# Patient Record
Sex: FEMALE | Race: OTHER | Employment: OTHER | URBAN - NONMETROPOLITAN AREA
[De-identification: names, ages, dates, MRNs, and addresses within clinical notes are randomized per-mention and may not be internally consistent; named-entity substitution may affect disease eponyms.]

---

## 2021-01-27 ENCOUNTER — OFFICE VISIT (OUTPATIENT)
Dept: INTERNAL MEDICINE | Age: 86
End: 2021-01-27
Payer: MEDICARE

## 2021-01-27 ENCOUNTER — TELEPHONE (OUTPATIENT)
Dept: INTERNAL MEDICINE | Age: 86
End: 2021-01-27

## 2021-01-27 VITALS
OXYGEN SATURATION: 96 % | DIASTOLIC BLOOD PRESSURE: 70 MMHG | HEART RATE: 84 BPM | HEIGHT: 69 IN | SYSTOLIC BLOOD PRESSURE: 118 MMHG

## 2021-01-27 DIAGNOSIS — K21.9 GASTROESOPHAGEAL REFLUX DISEASE WITHOUT ESOPHAGITIS: ICD-10-CM

## 2021-01-27 DIAGNOSIS — I48.91 ATRIAL FIBRILLATION, UNSPECIFIED TYPE (HCC): ICD-10-CM

## 2021-01-27 DIAGNOSIS — E55.9 HYPOVITAMINOSIS D: ICD-10-CM

## 2021-01-27 DIAGNOSIS — R31.9 HEMATURIA, UNSPECIFIED TYPE: Primary | ICD-10-CM

## 2021-01-27 DIAGNOSIS — E87.6 HYPOKALEMIA: ICD-10-CM

## 2021-01-27 DIAGNOSIS — M35.3 POLYMYALGIA RHEUMATICA (HCC): ICD-10-CM

## 2021-01-27 DIAGNOSIS — F41.9 ANXIETY: ICD-10-CM

## 2021-01-27 DIAGNOSIS — Z86.73 HISTORY OF CVA (CEREBROVASCULAR ACCIDENT): ICD-10-CM

## 2021-01-27 DIAGNOSIS — L89.156 PRESSURE INJURY OF DEEP TISSUE OF SACRAL REGION: ICD-10-CM

## 2021-01-27 DIAGNOSIS — R31.9 HEMATURIA, UNSPECIFIED TYPE: ICD-10-CM

## 2021-01-27 DIAGNOSIS — E43 SEVERE PROTEIN-CALORIE MALNUTRITION (HCC): ICD-10-CM

## 2021-01-27 PROBLEM — E46 PROTEIN-CALORIE MALNUTRITION (HCC): Status: ACTIVE | Noted: 2021-01-27

## 2021-01-27 LAB
ALBUMIN SERPL-MCNC: 2.6 G/DL (ref 3.5–5.2)
ALP BLD-CCNC: 106 U/L (ref 35–104)
ALT SERPL-CCNC: 22 U/L (ref 5–33)
ANION GAP SERPL CALCULATED.3IONS-SCNC: 12 MMOL/L (ref 7–19)
AST SERPL-CCNC: 20 U/L (ref 5–32)
BASOPHILS ABSOLUTE: 0.1 K/UL (ref 0–0.2)
BASOPHILS RELATIVE PERCENT: 0.7 % (ref 0–1)
BILIRUB SERPL-MCNC: 0.8 MG/DL (ref 0.2–1.2)
BUN BLDV-MCNC: 12 MG/DL (ref 8–23)
CALCIUM SERPL-MCNC: 8.5 MG/DL (ref 8.8–10.2)
CHLORIDE BLD-SCNC: 100 MMOL/L (ref 98–111)
CHOLESTEROL, TOTAL: 198 MG/DL (ref 160–199)
CO2: 28 MMOL/L (ref 22–29)
CREAT SERPL-MCNC: 0.5 MG/DL (ref 0.5–0.9)
EOSINOPHILS ABSOLUTE: 0.1 K/UL (ref 0–0.6)
EOSINOPHILS RELATIVE PERCENT: 0.9 % (ref 0–5)
GFR AFRICAN AMERICAN: >59
GFR NON-AFRICAN AMERICAN: >60
GLUCOSE BLD-MCNC: 145 MG/DL (ref 74–109)
HCT VFR BLD CALC: 39.9 % (ref 37–47)
HDLC SERPL-MCNC: 37 MG/DL (ref 65–121)
HEMOGLOBIN: 12.6 G/DL (ref 12–16)
IMMATURE GRANULOCYTES #: 0.1 K/UL
LDL CHOLESTEROL CALCULATED: 138 MG/DL
LYMPHOCYTES ABSOLUTE: 1.2 K/UL (ref 1.1–4.5)
LYMPHOCYTES RELATIVE PERCENT: 9.7 % (ref 20–40)
MCH RBC QN AUTO: 29.9 PG (ref 27–31)
MCHC RBC AUTO-ENTMCNC: 31.6 G/DL (ref 33–37)
MCV RBC AUTO: 94.8 FL (ref 81–99)
MONOCYTES ABSOLUTE: 0.7 K/UL (ref 0–0.9)
MONOCYTES RELATIVE PERCENT: 5.4 % (ref 0–10)
NEUTROPHILS ABSOLUTE: 10.5 K/UL (ref 1.5–7.5)
NEUTROPHILS RELATIVE PERCENT: 82.7 % (ref 50–65)
PDW BLD-RTO: 15.1 % (ref 11.5–14.5)
PLATELET # BLD: 377 K/UL (ref 130–400)
PMV BLD AUTO: 9.8 FL (ref 9.4–12.3)
POTASSIUM SERPL-SCNC: 2.9 MMOL/L (ref 3.5–5)
RBC # BLD: 4.21 M/UL (ref 4.2–5.4)
SEDIMENTATION RATE, ERYTHROCYTE: 80 MM/HR (ref 0–25)
SODIUM BLD-SCNC: 140 MMOL/L (ref 136–145)
TOTAL PROTEIN: 7.5 G/DL (ref 6.6–8.7)
TRIGL SERPL-MCNC: 115 MG/DL (ref 0–149)
TSH SERPL DL<=0.05 MIU/L-ACNC: 2.32 UIU/ML (ref 0.27–4.2)
VITAMIN D 25-HYDROXY: 11.6 NG/ML
WBC # BLD: 12.7 K/UL (ref 4.8–10.8)

## 2021-01-27 PROCEDURE — 99203 OFFICE O/P NEW LOW 30 MIN: CPT | Performed by: INTERNAL MEDICINE

## 2021-01-27 RX ORDER — OMEPRAZOLE 20 MG/1
CAPSULE, DELAYED RELEASE ORAL
COMMUNITY
End: 2021-05-24 | Stop reason: SDUPTHER

## 2021-01-27 RX ORDER — PREDNISONE 1 MG/1
TABLET ORAL
COMMUNITY
End: 2021-01-27 | Stop reason: SDUPTHER

## 2021-01-27 RX ORDER — METOPROLOL SUCCINATE 25 MG/1
TABLET, EXTENDED RELEASE ORAL
COMMUNITY
End: 2021-07-26

## 2021-01-27 RX ORDER — POTASSIUM CHLORIDE 750 MG/1
10 TABLET, EXTENDED RELEASE ORAL 2 TIMES DAILY
Qty: 10 TABLET | Refills: 0 | Status: SHIPPED | OUTPATIENT
Start: 2021-01-27 | End: 2021-09-21 | Stop reason: SDUPTHER

## 2021-01-27 RX ORDER — ERGOCALCIFEROL 1.25 MG/1
50000 CAPSULE ORAL WEEKLY
Qty: 12 CAPSULE | Refills: 1 | Status: SHIPPED | OUTPATIENT
Start: 2021-01-27 | End: 2021-09-21 | Stop reason: SDUPTHER

## 2021-01-27 RX ORDER — ALPRAZOLAM 0.5 MG/1
TABLET ORAL
COMMUNITY

## 2021-01-27 RX ORDER — PREDNISONE 1 MG/1
7.5 TABLET ORAL DAILY
Qty: 135 TABLET | Refills: 1
Start: 2021-01-27 | End: 2021-03-09 | Stop reason: SDUPTHER

## 2021-01-27 RX ORDER — DIGOXIN 125 MCG
TABLET ORAL
COMMUNITY

## 2021-01-27 SDOH — ECONOMIC STABILITY: FOOD INSECURITY: WITHIN THE PAST 12 MONTHS, YOU WORRIED THAT YOUR FOOD WOULD RUN OUT BEFORE YOU GOT MONEY TO BUY MORE.: NEVER TRUE

## 2021-01-27 SDOH — ECONOMIC STABILITY: FOOD INSECURITY: WITHIN THE PAST 12 MONTHS, THE FOOD YOU BOUGHT JUST DIDN'T LAST AND YOU DIDN'T HAVE MONEY TO GET MORE.: NEVER TRUE

## 2021-01-27 SDOH — ECONOMIC STABILITY: TRANSPORTATION INSECURITY
IN THE PAST 12 MONTHS, HAS THE LACK OF TRANSPORTATION KEPT YOU FROM MEDICAL APPOINTMENTS OR FROM GETTING MEDICATIONS?: NOT ASKED

## 2021-01-27 SDOH — ECONOMIC STABILITY: TRANSPORTATION INSECURITY
IN THE PAST 12 MONTHS, HAS LACK OF TRANSPORTATION KEPT YOU FROM MEETINGS, WORK, OR FROM GETTING THINGS NEEDED FOR DAILY LIVING?: NOT ASKED

## 2021-01-27 ASSESSMENT — ENCOUNTER SYMPTOMS
BLOOD IN STOOL: 0
DIARRHEA: 0
RHINORRHEA: 0
WHEEZING: 0
TROUBLE SWALLOWING: 0
COUGH: 0
SHORTNESS OF BREATH: 0
SINUS PAIN: 0
VOMITING: 0
CHEST TIGHTNESS: 0
ABDOMINAL PAIN: 0
CONSTIPATION: 0
BACK PAIN: 0

## 2021-01-27 ASSESSMENT — PATIENT HEALTH QUESTIONNAIRE - PHQ9
SUM OF ALL RESPONSES TO PHQ QUESTIONS 1-9: 0
SUM OF ALL RESPONSES TO PHQ QUESTIONS 1-9: 0
2. FEELING DOWN, DEPRESSED OR HOPELESS: 0
SUM OF ALL RESPONSES TO PHQ9 QUESTIONS 1 & 2: 0
1. LITTLE INTEREST OR PLEASURE IN DOING THINGS: 0

## 2021-01-27 NOTE — TELEPHONE ENCOUNTER
Called and spoke to Sasha Carrillo 56 she voice understood the results she is fine with the increase in prednisone.

## 2021-01-27 NOTE — TELEPHONE ENCOUNTER
----- Message from Erasmo Meadows MD sent at 1/27/2021  4:01 PM CST -----  Extremely low vitamin D levels which will need replacement  Patient sedimentation rate is elevated and would like him to increase prednisone if granddaughter approves  TSH normal  Cholesterol level normal  Potassium very low patient needs potassium for the next couple of days and albumin is 2.5 patient is extremely malnourished

## 2021-01-29 ENCOUNTER — TELEPHONE (OUTPATIENT)
Dept: INTERNAL MEDICINE CLINIC | Age: 86
End: 2021-01-29

## 2021-01-29 NOTE — TELEPHONE ENCOUNTER
[address] [address] OT requesting prescription for narrow width, nafisa height light weight wheelchair and gel cusion be faxed  to [facility]      Also fyi :  SW will be going next week due to having multiple [place] staff  visits today

## 2021-02-01 ENCOUNTER — TELEPHONE (OUTPATIENT)
Dept: INTERNAL MEDICINE CLINIC | Age: 86
End: 2021-02-01

## 2021-02-01 NOTE — TELEPHONE ENCOUNTER
Jasen Garcia did follow up with pt grdt, Dr Alvarez Martinez, this morning. Per DILIA \" Dilia and Grdt completed a phone visit with discussion of advance directives for pt as well as applying for community resources. The grdt asked for a link to apply for food stamps and sw provided that and she asked about getting appointed as pts health care proxy(her words). Sw educated her on how sw could help with this but grdt stated she does not think pt will understand. The grdt stated they did have some advance directives in place and she is going to try and locate them, so they will not have to be done again. If she cannot find them, she plans to talk to pt in her native language and see if she can get her to understand. Grdt stated she will call sw back at a later time if sw help is needed. Sw did go ahead and schedule another phone visit if needed.  Sw to follow as instructed by the grdt, Dr Alvarez Martinez \"    cristopheri

## 2021-02-08 ENCOUNTER — TELEPHONE (OUTPATIENT)
Dept: INTERNAL MEDICINE CLINIC | Age: 86
End: 2021-02-08

## 2021-02-08 NOTE — TELEPHONE ENCOUNTER
[address] [address] OT eval done and per OT :   Pt/family training with DME for bathing routine; pt continues to require assistance  OT  instructed pd cg on transfer training to TTB  (pt adamantly refused transfer or shower) Education Completion/Further need:  instructed pd cg on use of TTB  good return demo from pd cg on technique for use of TTB  pt verbally and physically refuses to attempt transfer or shower training with MU[facility]IZ this date  at previous session reluctantly did so but required verbal/visual and tactile cues and assist to scoot on bench  total assist for bathing/dressing and would not attempt to actively participate in either  no further OT needs identified at this time    Recommending :  PCA/ Bath Aid  2x/week for duration of skilled nursing care    Please advise if approved

## 2021-02-12 ENCOUNTER — TELEPHONE (OUTPATIENT)
Dept: INTERNAL MEDICINE | Age: 86
End: 2021-02-12

## 2021-02-12 NOTE — TELEPHONE ENCOUNTER
[facility] called stated that the patient met 4 of her goals for PT and they have discharged her today.

## 2021-02-17 ENCOUNTER — TELEPHONE (OUTPATIENT)
Dept: INTERNAL MEDICINE CLINIC | Age: 86
End: 2021-02-17

## 2021-02-17 NOTE — TELEPHONE ENCOUNTER
[address] [facility] 9 requesting to add on speech therapy eval due to pt chokes on liquids     Please advise

## 2021-02-19 ENCOUNTER — TELEPHONE (OUTPATIENT)
Dept: INTERNAL MEDICINE CLINIC | Age: 86
End: 2021-02-19

## 2021-02-19 NOTE — TELEPHONE ENCOUNTER
[address] [address] Speech Therapy attempted eval today and ST did not feel they really were able to assess her well  - so she has planned to eval again next week with Dr Kostas Marlow there to assist   They had  however still not able to communicate well with her so needs her grand dgt there to try and help her with it     Please advise if approved for another ST eval  /

## 2021-02-23 ENCOUNTER — TELEPHONE (OUTPATIENT)
Dept: INTERNAL MEDICINE CLINIC | Age: 86
End: 2021-02-23

## 2021-02-23 NOTE — TELEPHONE ENCOUNTER
Jasen Hodge worker update  :   MELQUIADES made contact with pt granddaughter 2/22  educating grdt on options for long term in home support. It was finally decided that Melquiades would make medicaid waiver referrals to [place] [facility] on pt and her spouse(who is coming home today to be admitted to [facility]). Melquiades assisted grdt in planning for their care until waiver is in place and she is going to talk to people she knows about hiring them. Melquiades also coordinated with rich and chris with [employer] that the new private pay staff she is planning to hire 24 hours a day could be possibly hired through [employer] once waiver is approved as this sw is requesting 40 hours a week on both pt and spouse(40 hours is the max that medicaid will pay). Melquiades to follow. Morena Feliz is going is looking to hire 24/7 caregivers at this time for pt and spouse and then when waiver gets in place, will only have to hire caregivers for hours not covered by waiver. Melquiades had offered to meet grdt in person but it seems that phone visits and texting work better for her. Sw to put in additional phone visits on pt to remain involved in coordinating care.  If home visit ends up being needed, melquiades will request from MD.  referral for attendant care has been phoned in and emailed to Kofi yu with [employer] and jaciel was given the contact # for [employer] as well     lucia

## 2021-02-28 DIAGNOSIS — E87.6 HYPOKALEMIA: ICD-10-CM

## 2021-03-01 RX ORDER — POTASSIUM CHLORIDE 750 MG/1
TABLET, EXTENDED RELEASE ORAL
Qty: 10 TABLET | Refills: 0 | OUTPATIENT
Start: 2021-03-01

## 2021-03-01 NOTE — TELEPHONE ENCOUNTER
Called Spoke to Walker Holter let her know we would need to get her potassium rechecked first she voice understood.

## 2021-03-01 NOTE — TELEPHONE ENCOUNTER
Arnaldo Lyons called requesting a refill of the below medication which has been pended for you:     Requested Prescriptions     Pending Prescriptions Disp Refills    potassium chloride (KLOR-CON M) 10 MEQ extended release tablet [Pharmacy Med Name: POTASSIUM CHLORIDE ER M-10 MEQ TAB] 10 tablet 0     Sig: TAKE ONE TABLET BY MOUTH TWICE A DAY FOR 5 DAYS       Last Appointment Date: 1/27/2021  Next Appointment Date: 5/5/2021    No Known Allergies

## 2021-03-05 ENCOUNTER — TELEPHONE (OUTPATIENT)
Dept: INTERNAL MEDICINE CLINIC | Age: 86
End: 2021-03-05

## 2021-03-05 NOTE — TELEPHONE ENCOUNTER
[address] reporting that ST plan of care is completed Patient's caregivers have been educated about swallow safety precautions, including thickening liquids to a nectar consistency. Caregivers verbalized understanding of all strategies, and written instructions are posted in the home. A supply of thickener has been provided, and the caregivers are going to speak with their boss about purchasing a larger container of thickener for the patient.  -    lucia

## 2021-03-09 RX ORDER — PREDNISONE 1 MG/1
7.5 TABLET ORAL DAILY
Qty: 135 TABLET | Refills: 1 | OUTPATIENT
Start: 2021-03-09 | End: 2021-06-07

## 2021-03-09 RX ORDER — PREDNISONE 1 MG/1
7.5 TABLET ORAL DAILY
Qty: 135 TABLET | Refills: 3 | Status: SHIPPED | OUTPATIENT
Start: 2021-03-09 | End: 2021-06-07

## 2021-03-09 NOTE — TELEPHONE ENCOUNTER
Augusto Gutiérrez called requesting a refill of the below medication which has been pended for you:     Requested Prescriptions     Pending Prescriptions Disp Refills    predniSONE (DELTASONE) 5 MG tablet 135 tablet 1     Sig: Take 1.5 tablets by mouth daily       Last Appointment Date: 1/27/2021  Next Appointment Date: 5/5/2021    No Known Allergies

## 2021-03-11 ENCOUNTER — TELEPHONE (OUTPATIENT)
Dept: INTERNAL MEDICINE CLINIC | Age: 86
End: 2021-03-11

## 2021-04-15 ENCOUNTER — TELEPHONE (OUTPATIENT)
Dept: INTERNAL MEDICINE | Age: 86
End: 2021-04-15

## 2021-04-15 DIAGNOSIS — Z00.00 ROUTINE ADULT HEALTH MAINTENANCE: ICD-10-CM

## 2021-04-15 DIAGNOSIS — E87.6 HYPOKALEMIA: Primary | ICD-10-CM

## 2021-04-20 RX ORDER — OMEPRAZOLE 20 MG/1
CAPSULE, DELAYED RELEASE ORAL
Qty: 30 CAPSULE | Refills: 0 | OUTPATIENT
Start: 2021-04-20

## 2021-05-24 RX ORDER — OMEPRAZOLE 20 MG/1
20 CAPSULE, DELAYED RELEASE ORAL DAILY
Qty: 90 CAPSULE | Refills: 3 | OUTPATIENT
Start: 2021-05-24 | End: 2021-06-23

## 2021-05-24 RX ORDER — OMEPRAZOLE 20 MG/1
20 CAPSULE, DELAYED RELEASE ORAL DAILY
Qty: 30 CAPSULE | Refills: 2 | Status: SHIPPED | OUTPATIENT
Start: 2021-05-24 | End: 2021-10-11

## 2021-05-24 RX ORDER — OMEPRAZOLE 20 MG/1
CAPSULE, DELAYED RELEASE ORAL
Qty: 30 CAPSULE | Refills: 0 | OUTPATIENT
Start: 2021-05-24

## 2021-05-24 NOTE — TELEPHONE ENCOUNTER
Francie Walker called requesting a refill of the below medication which has been pended for you:     Requested Prescriptions     Pending Prescriptions Disp Refills    omeprazole (PRILOSEC) 20 MG delayed release capsule 90 capsule 3     Sig: Take 1 capsule by mouth Daily       Last Appointment Date: 1/27/2021  Next Appointment Date: 6/2/2021    No Known Allergies

## 2021-07-11 ASSESSMENT — ENCOUNTER SYMPTOMS
BLOOD IN STOOL: 0
TROUBLE SWALLOWING: 0
ABDOMINAL PAIN: 0
CHEST TIGHTNESS: 0
SHORTNESS OF BREATH: 0
CONSTIPATION: 0
VOMITING: 0
WHEEZING: 0
SINUS PAIN: 0
DIARRHEA: 0
COUGH: 0
BACK PAIN: 0
RHINORRHEA: 0

## 2021-07-12 ENCOUNTER — OFFICE VISIT (OUTPATIENT)
Dept: INTERNAL MEDICINE | Age: 86
End: 2021-07-12
Payer: MEDICARE

## 2021-07-12 ENCOUNTER — HOSPITAL ENCOUNTER (OUTPATIENT)
Dept: GENERAL RADIOLOGY | Age: 86
Discharge: HOME | End: 2021-07-12
Payer: MEDICARE

## 2021-07-12 VITALS
SYSTOLIC BLOOD PRESSURE: 108 MMHG | OXYGEN SATURATION: 97 % | DIASTOLIC BLOOD PRESSURE: 60 MMHG | HEIGHT: 69 IN | HEART RATE: 118 BPM

## 2021-07-12 DIAGNOSIS — E87.6 HYPOKALEMIA: ICD-10-CM

## 2021-07-12 DIAGNOSIS — M79.622 LEFT UPPER ARM PAIN: ICD-10-CM

## 2021-07-12 DIAGNOSIS — L89.156 PRESSURE INJURY OF DEEP TISSUE OF SACRAL REGION: ICD-10-CM

## 2021-07-12 DIAGNOSIS — Z00.00 ROUTINE ADULT HEALTH MAINTENANCE: ICD-10-CM

## 2021-07-12 DIAGNOSIS — M35.3 POLYMYALGIA RHEUMATICA (HCC): ICD-10-CM

## 2021-07-12 DIAGNOSIS — E55.9 HYPOVITAMINOSIS D: ICD-10-CM

## 2021-07-12 DIAGNOSIS — E55.9 HYPOVITAMINOSIS D: Primary | ICD-10-CM

## 2021-07-12 DIAGNOSIS — K21.9 GASTROESOPHAGEAL REFLUX DISEASE WITHOUT ESOPHAGITIS: ICD-10-CM

## 2021-07-12 DIAGNOSIS — I48.91 ATRIAL FIBRILLATION, UNSPECIFIED TYPE (HCC): ICD-10-CM

## 2021-07-12 LAB
ALBUMIN SERPL-MCNC: 2.5 G/DL (ref 3.5–5.2)
ALP BLD-CCNC: 79 U/L (ref 35–104)
ALT SERPL-CCNC: 7 U/L (ref 5–33)
ANION GAP SERPL CALCULATED.3IONS-SCNC: 12 MMOL/L (ref 7–19)
AST SERPL-CCNC: 14 U/L (ref 5–32)
BILIRUB SERPL-MCNC: 0.4 MG/DL (ref 0.2–1.2)
BUN BLDV-MCNC: 21 MG/DL (ref 8–23)
CALCIUM SERPL-MCNC: 9 MG/DL (ref 8.8–10.2)
CHLORIDE BLD-SCNC: 100 MMOL/L (ref 98–111)
CHOLESTEROL, TOTAL: 167 MG/DL (ref 160–199)
CO2: 26 MMOL/L (ref 22–29)
CREAT SERPL-MCNC: 0.5 MG/DL (ref 0.5–0.9)
GFR AFRICAN AMERICAN: >59
GFR NON-AFRICAN AMERICAN: >60
GLUCOSE BLD-MCNC: 168 MG/DL (ref 74–109)
HCT VFR BLD CALC: 35.3 % (ref 37–47)
HDLC SERPL-MCNC: 45 MG/DL (ref 65–121)
HEMOGLOBIN: 10.6 G/DL (ref 12–16)
LDL CHOLESTEROL CALCULATED: 104 MG/DL
MCH RBC QN AUTO: 30.5 PG (ref 27–31)
MCHC RBC AUTO-ENTMCNC: 30 G/DL (ref 33–37)
MCV RBC AUTO: 101.4 FL (ref 81–99)
PDW BLD-RTO: 14.6 % (ref 11.5–14.5)
PLATELET # BLD: 413 K/UL (ref 130–400)
PMV BLD AUTO: 9.4 FL (ref 9.4–12.3)
POTASSIUM SERPL-SCNC: 3.5 MMOL/L (ref 3.5–5)
RBC # BLD: 3.48 M/UL (ref 4.2–5.4)
SODIUM BLD-SCNC: 138 MMOL/L (ref 136–145)
TOTAL PROTEIN: 7.4 G/DL (ref 6.6–8.7)
TRIGL SERPL-MCNC: 90 MG/DL (ref 0–149)
TSH SERPL DL<=0.05 MIU/L-ACNC: 7.62 UIU/ML (ref 0.27–4.2)
VITAMIN D 25-HYDROXY: 25.6 NG/ML
WBC # BLD: 11.5 K/UL (ref 4.8–10.8)

## 2021-07-12 PROCEDURE — 73090 X-RAY EXAM OF FOREARM: CPT

## 2021-07-12 PROCEDURE — 99214 OFFICE O/P EST MOD 30 MIN: CPT | Performed by: INTERNAL MEDICINE

## 2021-07-12 PROCEDURE — 73060 X-RAY EXAM OF HUMERUS: CPT

## 2021-07-12 ASSESSMENT — PATIENT HEALTH QUESTIONNAIRE - PHQ9
1. LITTLE INTEREST OR PLEASURE IN DOING THINGS: 0
SUM OF ALL RESPONSES TO PHQ QUESTIONS 1-9: 0
SUM OF ALL RESPONSES TO PHQ QUESTIONS 1-9: 0
SUM OF ALL RESPONSES TO PHQ9 QUESTIONS 1 & 2: 0
2. FEELING DOWN, DEPRESSED OR HOPELESS: 0
SUM OF ALL RESPONSES TO PHQ QUESTIONS 1-9: 0

## 2021-07-12 NOTE — ASSESSMENT & PLAN NOTE
Borderline controlled, continue current medications, medication adherence emphasized and lifestyle modifications recommended

## 2021-07-12 NOTE — PROGRESS NOTES
Desmond Steward ( 1932) is a 80 y.o. female, Established , here for evaluation of the following chief complaint(s).   Other (left arm pain, bottom hurts)        ASSESSMENT/PLAN:  Problem List        Circulatory    Atrial fibrillation (HCC)      Well-controlled, continue current medications and continue current treatment plan            Digestive    Gastroesophageal reflux disease      Well-controlled, continue current medications, medication adherence emphasized and lifestyle modifications recommended         Relevant Medications    omeprazole (PRILOSEC) 20 MG delayed release capsule       Musculoskeletal and Integument    Pressure injury of deep tissue of sacral region      Borderline controlled, continue current medications, medication adherence emphasized and lifestyle modifications recommended            Other    Hypokalemia      Unclear control, continue current medications, medication adherence emphasized, lifestyle modifications recommended and Repeat potassium levels today         Relevant Medications    potassium chloride (KLOR-CON M) 10 MEQ extended release tablet    Hypovitaminosis D - Primary    Relevant Medications    vitamin D (ERGOCALCIFEROL) 1.25 MG (10620 UT) CAPS capsule    Other Relevant Orders    Vitamin D 25 Hydroxy    Left upper arm pain      Unclear control, continue current medications and Patient almost has pain touching the skin she will not allow physician to examine her arm physically it looks intact unclear if it has any fractures will order x-ray         Relevant Orders    XR HUMERUS LEFT (MIN 2 VIEWS)    XR RADIUS ULNA LEFT (2 VIEWS)    Polymyalgia rheumatica (HCC)      Patient not on any steroids as family was not keen on starting steroids               Results for orders placed or performed in visit on 21   Sedimentation Rate   Result Value Ref Range    Sed Rate 80 (H) 0 - 25 mm/Hr   Vitamin D 25 Hydroxy   Result Value Ref Range    Vit D, 25-Hydroxy 11.6 (L) >=30 ng/mL   TSH without Reflex   Result Value Ref Range    TSH 2.320 0.270 - 4.200 uIU/mL   Lipid Panel   Result Value Ref Range    Cholesterol, Total 198 160 - 199 mg/dL    Triglycerides 115 0 - 149 mg/dL    HDL 37 (L) 65 - 121 mg/dL    LDL Calculated 138 <100 mg/dL   Comprehensive Metabolic Panel   Result Value Ref Range    Sodium 140 136 - 145 mmol/L    Potassium 2.9 (L) 3.5 - 5.0 mmol/L    Chloride 100 98 - 111 mmol/L    CO2 28 22 - 29 mmol/L    Anion Gap 12 7 - 19 mmol/L    Glucose 145 (H) 74 - 109 mg/dL    BUN 12 8 - 23 mg/dL    CREATININE 0.5 0.5 - 0.9 mg/dL    GFR Non-African American >60 >60    GFR African American >59 >59    Calcium 8.5 (L) 8.8 - 10.2 mg/dL    Total Protein 7.5 6.6 - 8.7 g/dL    Albumin 2.6 (L) 3.5 - 5.2 g/dL    Total Bilirubin 0.8 0.2 - 1.2 mg/dL    Alkaline Phosphatase 106 (H) 35 - 104 U/L    ALT 22 5 - 33 U/L    AST 20 5 - 32 U/L   CBC Auto Differential   Result Value Ref Range    WBC 12.7 (H) 4.8 - 10.8 K/uL    RBC 4.21 4.20 - 5.40 M/uL    Hemoglobin 12.6 12.0 - 16.0 g/dL    Hematocrit 39.9 37.0 - 47.0 %    MCV 94.8 81.0 - 99.0 fL    MCH 29.9 27.0 - 31.0 pg    MCHC 31.6 (L) 33.0 - 37.0 g/dL    RDW 15.1 (H) 11.5 - 14.5 %    Platelets 100 305 - 331 K/uL    MPV 9.8 9.4 - 12.3 fL    Neutrophils % 82.7 (H) 50.0 - 65.0 %    Lymphocytes % 9.7 (L) 20.0 - 40.0 %    Monocytes % 5.4 0.0 - 10.0 %    Eosinophils % 0.9 0.0 - 5.0 %    Basophils % 0.7 0.0 - 1.0 %    Neutrophils Absolute 10.5 (H) 1.5 - 7.5 K/uL    Immature Granulocytes # 0.1 K/uL    Lymphocytes Absolute 1.2 1.1 - 4.5 K/uL    Monocytes Absolute 0.70 0.00 - 0.90 K/uL    Eosinophils Absolute 0.10 0.00 - 0.60 K/uL    Basophils Absolute 0.10 0.00 - 0.20 K/uL       No follow-ups on file.     HPI  42-year-old female Salvadorean-speaking presents with her home care attendant  Patient has been eating well continues to be confined to wheelchair given her medications complains of left upper arm pain today there is no recent trauma patient does not want her left arm touched but she is able to move her hand although she does want to raise her left arm    Review of Systems   Constitutional: Negative for activity change, chills, fatigue and fever. HENT: Negative for hearing loss, postnasal drip, rhinorrhea, sinus pain and trouble swallowing. Eyes: Negative for visual disturbance. Respiratory: Negative for cough, chest tightness, shortness of breath and wheezing. Cardiovascular: Negative for chest pain, palpitations and leg swelling. Gastrointestinal: Negative for abdominal pain, blood in stool, constipation, diarrhea and vomiting. Endocrine: Negative for cold intolerance. Genitourinary: Positive for hematuria. Negative for frequency and urgency. Musculoskeletal: Positive for arthralgias. Negative for back pain and myalgias. Skin: Positive for wound (low back). Allergic/Immunologic: Negative for environmental allergies. Neurological: Positive for weakness. Negative for light-headedness, numbness and headaches. Psychiatric/Behavioral: Positive for confusion and decreased concentration. Physical Exam  Vitals reviewed. Constitutional:       Appearance: She is cachectic. Comments: In a wheelchair   HENT:      Head: Normocephalic. Right Ear: Hearing normal.      Left Ear: Hearing normal.   Eyes:      General: Lids are normal.      Conjunctiva/sclera: Conjunctivae normal.   Neck:      Thyroid: No thyroid mass. Trachea: Trachea normal.   Cardiovascular:      Rate and Rhythm: Rhythm irregular. Pulses: Normal pulses. Pulmonary:      Effort: Pulmonary effort is normal.      Breath sounds: Normal breath sounds and air entry. Abdominal:      General: Abdomen is flat. Bowel sounds are normal.      Palpations: Abdomen is soft. Tenderness: There is no abdominal tenderness. Musculoskeletal:         General: Tenderness (L arm) present. Cervical back: Normal range of motion. Right lower leg: No edema.       Left lower leg: No edema. Lymphadenopathy:      Cervical: No cervical adenopathy. Skin:     Findings: Lesion present. Neurological:      General: No focal deficit present. Mental Status: She is alert. Cranial Nerves: No cranial nerve deficit. Sensory: No sensory deficit. Motor: Weakness, tremor and abnormal muscle tone present.       Coordination: Coordination abnormal.      Gait: Gait abnormal and tandem walk abnormal.   Psychiatric:      Comments: Non english speaking           (Time Documentation Optional [id number])    An electronic signaturewaas used to authenticate this note  -Roula Garcia MD on 7/12/2021 at 12:47 PM

## 2021-07-12 NOTE — ASSESSMENT & PLAN NOTE
Unclear control, continue current medications and Patient almost has pain touching the skin she will not allow physician to examine her arm physically it looks intact unclear if it has any fractures will order x-ray

## 2021-07-13 DIAGNOSIS — R73.9 HYPERGLYCEMIA: ICD-10-CM

## 2021-07-13 DIAGNOSIS — R73.9 HYPERGLYCEMIA: Primary | ICD-10-CM

## 2021-07-13 LAB — HBA1C MFR BLD: 5.1 % (ref 4–6)

## 2021-07-26 RX ORDER — METOPROLOL SUCCINATE 25 MG/1
TABLET, EXTENDED RELEASE ORAL
Qty: 30 TABLET | Refills: 0 | Status: SHIPPED | OUTPATIENT
Start: 2021-07-26 | End: 2021-10-11 | Stop reason: SDUPTHER

## 2021-09-04 RX ORDER — DIGOXIN 125 MCG
125 TABLET ORAL DAILY
Qty: 30 TABLET | Refills: 0 | Status: SHIPPED | OUTPATIENT
Start: 2021-09-04 | End: 2021-10-11

## 2021-09-21 DIAGNOSIS — E87.6 HYPOKALEMIA: ICD-10-CM

## 2021-09-21 DIAGNOSIS — E55.9 HYPOVITAMINOSIS D: ICD-10-CM

## 2021-09-21 RX ORDER — POTASSIUM CHLORIDE 750 MG/1
10 TABLET, EXTENDED RELEASE ORAL 2 TIMES DAILY
Qty: 10 TABLET | Refills: 0 | Status: SHIPPED | OUTPATIENT
Start: 2021-09-21 | End: 2021-10-11 | Stop reason: SDUPTHER

## 2021-09-21 RX ORDER — ERGOCALCIFEROL 1.25 MG/1
50000 CAPSULE ORAL WEEKLY
Qty: 12 CAPSULE | Refills: 1 | Status: SHIPPED | OUTPATIENT
Start: 2021-09-21 | End: 2021-10-11 | Stop reason: SDUPTHER

## 2021-09-21 NOTE — TELEPHONE ENCOUNTER
Tressa Hayes called to request a refill on her medication.       Last office visit : 7/12/2021   Next office visit : 1/12/2022     Requested Prescriptions     Pending Prescriptions Disp Refills    potassium chloride (KLOR-CON M) 10 MEQ extended release tablet 10 tablet 0     Sig: Take 1 tablet by mouth 2 times daily for 5 days    vitamin D (ERGOCALCIFEROL) 1.25 MG (47271 UT) CAPS capsule 12 capsule 1     Sig: Take 1 capsule by mouth once a week            Mark Brooks

## 2021-10-11 DIAGNOSIS — E55.9 HYPOVITAMINOSIS D: ICD-10-CM

## 2021-10-11 DIAGNOSIS — E87.6 HYPOKALEMIA: ICD-10-CM

## 2021-10-11 RX ORDER — METOPROLOL SUCCINATE 25 MG/1
TABLET, EXTENDED RELEASE ORAL
Qty: 90 TABLET | Refills: 1 | Status: SHIPPED | OUTPATIENT
Start: 2021-10-11 | End: 2022-05-02 | Stop reason: SDUPTHER

## 2021-10-11 RX ORDER — PREDNISONE 1 MG/1
5 TABLET ORAL DAILY
Qty: 90 TABLET | Refills: 1 | Status: SHIPPED | OUTPATIENT
Start: 2021-10-11 | End: 2022-07-12

## 2021-10-11 RX ORDER — APIXABAN 2.5 MG/1
TABLET, FILM COATED ORAL
Qty: 30 TABLET | Refills: 0 | Status: SHIPPED | OUTPATIENT
Start: 2021-10-11 | End: 2021-10-11 | Stop reason: SDUPTHER

## 2021-10-11 RX ORDER — ERGOCALCIFEROL 1.25 MG/1
50000 CAPSULE ORAL WEEKLY
Qty: 12 CAPSULE | Refills: 1 | Status: SHIPPED | OUTPATIENT
Start: 2021-10-11

## 2021-10-11 RX ORDER — DIGOXIN 125 MCG
TABLET ORAL
Qty: 30 TABLET | Refills: 0 | Status: SHIPPED | OUTPATIENT
Start: 2021-10-11 | End: 2021-10-11 | Stop reason: SDUPTHER

## 2021-10-11 RX ORDER — OMEPRAZOLE 20 MG/1
CAPSULE, DELAYED RELEASE ORAL
Qty: 30 CAPSULE | Refills: 2 | Status: SHIPPED | OUTPATIENT
Start: 2021-10-11 | End: 2021-10-11 | Stop reason: SDUPTHER

## 2021-10-11 RX ORDER — OMEPRAZOLE 20 MG/1
CAPSULE, DELAYED RELEASE ORAL
Qty: 90 CAPSULE | Refills: 1 | Status: SHIPPED | OUTPATIENT
Start: 2021-10-11 | End: 2022-05-02 | Stop reason: SDUPTHER

## 2021-10-11 RX ORDER — DIGOXIN 125 MCG
TABLET ORAL
Qty: 90 TABLET | Refills: 1 | Status: SHIPPED | OUTPATIENT
Start: 2021-10-11 | End: 2022-05-02 | Stop reason: SDUPTHER

## 2021-10-11 RX ORDER — POTASSIUM CHLORIDE 750 MG/1
10 TABLET, EXTENDED RELEASE ORAL 2 TIMES DAILY
Qty: 10 TABLET | Refills: 0 | Status: SHIPPED | OUTPATIENT
Start: 2021-10-11 | End: 2021-10-16

## 2021-10-11 NOTE — TELEPHONE ENCOUNTER
Bookerleonid Botello called to request a refill on her medication.       Last office visit : 7/12/2021   Next office visit : 1/12/2022     Requested Prescriptions     Pending Prescriptions Disp Refills    digoxin (LANOXIN) 125 MCG tablet 90 tablet 1     Sig: TAKE ONE TABLET BY MOUTH DAILY    apixaban (ELIQUIS) 2.5 MG TABS tablet 90 tablet 1     Sig: TAKE ONE TABLET BY MOUTH DAILY    metoprolol succinate (TOPROL XL) 25 MG extended release tablet 90 tablet 1     Sig: TAKE ONE TABLET BY MOUTH DAILY    omeprazole (PRILOSEC) 20 MG delayed release capsule 90 capsule 1     Sig: TAKE ONE CAPSULE BY MOUTH DAILY    potassium chloride (KLOR-CON M) 10 MEQ extended release tablet 10 tablet 0     Sig: Take 1 tablet by mouth 2 times daily for 5 days    predniSONE (DELTASONE) 5 MG tablet 90 tablet 1     Sig: Take 1 tablet by mouth daily    vitamin D (ERGOCALCIFEROL) 1.25 MG (93925 UT) CAPS capsule 12 capsule 1     Sig: Take 1 capsule by mouth once a week            Mark Crowder

## 2021-11-19 ENCOUNTER — TELEPHONE (OUTPATIENT)
Dept: INTERNAL MEDICINE | Age: 86
End: 2021-11-19

## 2021-12-20 ENCOUNTER — TELEPHONE (OUTPATIENT)
Dept: INTERNAL MEDICINE | Age: 86
End: 2021-12-20

## 2021-12-20 DIAGNOSIS — Z13.220 SCREENING FOR LIPID DISORDERS: ICD-10-CM

## 2021-12-20 DIAGNOSIS — Z13.29 SCREENING FOR THYROID DISORDER: ICD-10-CM

## 2021-12-20 DIAGNOSIS — R73.9 HYPERGLYCEMIA: Primary | ICD-10-CM

## 2021-12-20 DIAGNOSIS — Z00.00 ENCOUNTER FOR ROUTINE ADULT HEALTH EXAMINATION WITHOUT ABNORMAL FINDINGS: ICD-10-CM

## 2021-12-20 DIAGNOSIS — M35.3 POLYMYALGIA RHEUMATICA (HCC): ICD-10-CM

## 2021-12-20 DIAGNOSIS — Z00.00 ROUTINE ADULT HEALTH MAINTENANCE: ICD-10-CM

## 2022-03-18 ENCOUNTER — TELEPHONE (OUTPATIENT)
Dept: INTERNAL MEDICINE | Age: 87
End: 2022-03-18

## 2022-03-18 NOTE — TELEPHONE ENCOUNTER
PT is over due for a follow up and Medicare wellness I have attempted to call to get pt scheduled with no return calls. I have sent 2 letters for pt to call to get scheduled.

## 2022-05-02 DIAGNOSIS — K21.00 GASTROESOPHAGEAL REFLUX DISEASE WITH ESOPHAGITIS WITHOUT HEMORRHAGE: ICD-10-CM

## 2022-05-02 DIAGNOSIS — I48.0 PAROXYSMAL ATRIAL FIBRILLATION (HCC): Primary | ICD-10-CM

## 2022-05-03 RX ORDER — DIGOXIN 125 MCG
TABLET ORAL
Qty: 90 TABLET | Refills: 0 | Status: SHIPPED | OUTPATIENT
Start: 2022-05-03 | End: 2022-05-11 | Stop reason: SDUPTHER

## 2022-05-03 RX ORDER — METOPROLOL SUCCINATE 25 MG/1
TABLET, EXTENDED RELEASE ORAL
Qty: 90 TABLET | Refills: 0 | Status: SHIPPED | OUTPATIENT
Start: 2022-05-03

## 2022-05-03 RX ORDER — OMEPRAZOLE 20 MG/1
CAPSULE, DELAYED RELEASE ORAL
Qty: 90 CAPSULE | Refills: 0 | Status: SHIPPED | OUTPATIENT
Start: 2022-05-03

## 2022-05-03 NOTE — TELEPHONE ENCOUNTER
Requested Prescriptions     Pending Prescriptions Disp Refills    digoxin (LANOXIN) 125 MCG tablet 90 tablet 1     Sig: TAKE ONE TABLET BY MOUTH DAILY    apixaban (ELIQUIS) 2.5 MG TABS tablet 90 tablet 1     Sig: TAKE ONE TABLET BY MOUTH DAILY    metoprolol succinate (TOPROL XL) 25 MG extended release tablet 90 tablet 1     Sig: TAKE ONE TABLET BY MOUTH DAILY    omeprazole (PRILOSEC) 20 MG delayed release capsule 90 capsule 1     Sig: TAKE ONE CAPSULE BY MOUTH DAILY

## 2022-05-11 DIAGNOSIS — I48.0 PAROXYSMAL ATRIAL FIBRILLATION (HCC): ICD-10-CM

## 2022-05-11 RX ORDER — DIGOXIN 125 MCG
TABLET ORAL
Qty: 90 TABLET | Refills: 3 | Status: SHIPPED | OUTPATIENT
Start: 2022-05-11

## 2022-07-09 DIAGNOSIS — M35.3 POLYMYALGIA RHEUMATICA (HCC): Primary | ICD-10-CM

## 2022-07-11 NOTE — TELEPHONE ENCOUNTER
Requested Prescriptions     Pending Prescriptions Disp Refills    predniSONE (DELTASONE) 5 MG tablet [Pharmacy Med Name: predniSONE 5 MG TABLET] 90 tablet 1     Sig: TAKE ONE TABLET BY MOUTH DAILY

## 2022-07-12 DIAGNOSIS — M35.3 POLYMYALGIA RHEUMATICA (HCC): ICD-10-CM

## 2022-07-12 RX ORDER — PREDNISONE 1 MG/1
TABLET ORAL
Qty: 90 TABLET | Refills: 0 | Status: SHIPPED | OUTPATIENT
Start: 2022-07-12

## 2022-07-13 RX ORDER — PREDNISONE 1 MG/1
5 TABLET ORAL DAILY
Qty: 90 TABLET | Refills: 0 | OUTPATIENT
Start: 2022-07-13

## 2022-07-13 NOTE — TELEPHONE ENCOUNTER
Marilia Cardoza called requesting a refill of the below medication which has been pended for you:     Requested Prescriptions     Pending Prescriptions Disp Refills    predniSONE (DELTASONE) 5 MG tablet 90 tablet 0     Sig: Take 1 tablet by mouth daily       Last Appointment Date: 7/12/2021  Next Appointment Date: Visit date not found    No Known Allergies

## 2024-02-05 NOTE — PROGRESS NOTES
Hemostasis started on the right femoral artery. Angio-Seal was used in achieving hemostasis. Closure device deployed in the vessel. Hemostasis achieved successfully. Kostas Lr ( 1932) is a 80 y.o. female, Established , here for evaluation of the following chief complaint(s). Establish Care (PCP Dr Sol Garcia, skin break down) and Hematuria        ASSESSMENT/PLAN:  Problem List        Circulatory    Atrial fibrillation ([facility] [address]     Patient will continue Metoprolol and Eliquis         Relevant Orders    Lipid Panel    TSH without Reflex    [facility]       Digestive    Gastroesophageal reflux disease     Omeprazole  20Mg daily         Relevant Medications    omeprazole (PRILOSEC) 20 MG delayed release capsule       Musculoskeletal and Integument    Pressure injury of deep tissue of sacral region     Will be referred to Home Care            Other    Anxiety    Relevant Medications    ALPRAZolam (XANAX) 0.5 MG tablet    Hematuria - Primary     Check UA and bladder ultrasound         Relevant Orders    AL MEASUREMENT,POST-VOID RESIDUAL VOLUME BY US,NON-IMAGING (Completed)    Urinalysis    Culture, Urine    [facility]    History of CVA (cerebrovascular accident)     Limited mobility, fall risk  Home care         Hypovitaminosis D     Check Vitamin D         Relevant Orders    Vitamin D 25 Hydroxy    [facility]    Polymyalgia rheumatica (HCC)     Continue Prednisone, check ESR         Relevant Orders    CBC Auto Differential (Completed)    Comprehensive Metabolic Panel    Sedimentation Rate          No results found for this or any previous visit. No follow-ups on file. HPI    Review of Systems   Constitutional: Negative for activity change, chills, fatigue and fever. HENT: Negative for hearing loss, postnasal drip, rhinorrhea, sinus pain and trouble swallowing. Eyes: Negative for visual disturbance. Respiratory: Negative for cough, chest tightness, shortness of breath and wheezing. Cardiovascular: Negative for chest pain, palpitations and leg swelling.    Gastrointestinal: Negative for abdominal pain, blood in stool, constipation, diarrhea and vomiting. Endocrine: Negative for cold intolerance. Genitourinary: Positive for hematuria. Negative for frequency and urgency. Musculoskeletal: Negative for arthralgias, back pain and myalgias. Skin: Positive for wound (low back). Allergic/Immunologic: Negative for environmental allergies. Neurological: Positive for weakness. Negative for light-headedness, numbness and headaches. Psychiatric/Behavioral: Positive for confusion and decreased concentration. Physical Exam  Vitals signs reviewed. Constitutional:       Appearance: She is cachectic. Comments: In a wheelchair   HENT:      Head: Normocephalic. Right Ear: Hearing normal.      Left Ear: Hearing normal.   Eyes:      General: Lids are normal.      Conjunctiva/sclera: Conjunctivae normal.   Neck:      Musculoskeletal: Normal range of motion. Thyroid: No thyroid mass. Trachea: Trachea normal.   Cardiovascular:      Rate and Rhythm: Rhythm irregular. Pulses: Normal pulses. Pulmonary:      Effort: Pulmonary effort is normal.      Breath sounds: Normal breath sounds and air entry. Abdominal:      General: Abdomen is flat. Bowel sounds are normal.      Palpations: Abdomen is soft. Tenderness: There is no abdominal tenderness. Musculoskeletal:      Right lower leg: No edema. Left lower leg: No edema. Lymphadenopathy:      Cervical: No cervical adenopathy. Skin:     Findings: Lesion present. Neurological:      General: No focal deficit present. Mental Status: She is alert. Cranial Nerves: No cranial nerve deficit. Sensory: No sensory deficit. Motor: Weakness, tremor and abnormal muscle tone present.       Coordination: Coordination abnormal.      Gait: Gait abnormal and tandem walk abnormal.   Psychiatric:      Comments: Non english speaking           (Time Documentation Optional [id number])    An electronic signaturewaas used to authenticate this note  -Luis Manuel Johns MD on 1/27/2021 at 3:32 PM